# Patient Record
Sex: FEMALE | Race: WHITE | NOT HISPANIC OR LATINO | Employment: FULL TIME | URBAN - METROPOLITAN AREA
[De-identification: names, ages, dates, MRNs, and addresses within clinical notes are randomized per-mention and may not be internally consistent; named-entity substitution may affect disease eponyms.]

---

## 2017-10-18 ENCOUNTER — HOSPITAL ENCOUNTER (EMERGENCY)
Facility: HOSPITAL | Age: 42
Discharge: HOME | End: 2017-10-19
Attending: EMERGENCY MEDICINE
Payer: COMMERCIAL

## 2017-10-18 DIAGNOSIS — G89.29 CHRONIC EPIGASTRIC PAIN: Primary | ICD-10-CM

## 2017-10-18 DIAGNOSIS — R10.13 CHRONIC EPIGASTRIC PAIN: Primary | ICD-10-CM

## 2017-10-18 LAB
BASOPHILS # BLD AUTO: 0.04 THOUSANDS/ΜL (ref 0–0.1)
BASOPHILS NFR BLD AUTO: 0 % (ref 0–1)
EOSINOPHIL # BLD AUTO: 0.22 THOUSAND/ΜL (ref 0–0.61)
EOSINOPHIL NFR BLD AUTO: 2 % (ref 0–6)
ERYTHROCYTE [DISTWIDTH] IN BLOOD BY AUTOMATED COUNT: 13 % (ref 11.6–15.1)
HCT VFR BLD AUTO: 36.3 % (ref 34.8–46.1)
HGB BLD-MCNC: 12.1 G/DL (ref 11.5–15.4)
LYMPHOCYTES # BLD AUTO: 3.54 THOUSANDS/ΜL (ref 0.6–4.47)
LYMPHOCYTES NFR BLD AUTO: 32 % (ref 14–44)
MCH RBC QN AUTO: 30.5 PG (ref 26.8–34.3)
MCHC RBC AUTO-ENTMCNC: 33.3 G/DL (ref 31.4–37.4)
MCV RBC AUTO: 91 FL (ref 82–98)
MONOCYTES # BLD AUTO: 0.73 THOUSAND/ΜL (ref 0.17–1.22)
MONOCYTES NFR BLD AUTO: 7 % (ref 4–12)
NEUTROPHILS # BLD AUTO: 6.65 THOUSANDS/ΜL (ref 1.85–7.62)
NEUTS SEG NFR BLD AUTO: 59 % (ref 43–75)
PLATELET # BLD AUTO: 342 THOUSANDS/UL (ref 149–390)
PMV BLD AUTO: 9.4 FL (ref 8.9–12.7)
RBC # BLD AUTO: 3.97 MILLION/UL (ref 3.81–5.12)
WBC # BLD AUTO: 11.18 THOUSAND/UL (ref 4.31–10.16)

## 2017-10-18 PROCEDURE — 83690 ASSAY OF LIPASE: CPT | Performed by: EMERGENCY MEDICINE

## 2017-10-18 PROCEDURE — 96374 THER/PROPH/DIAG INJ IV PUSH: CPT

## 2017-10-18 PROCEDURE — 80053 COMPREHEN METABOLIC PANEL: CPT | Performed by: EMERGENCY MEDICINE

## 2017-10-18 PROCEDURE — 36415 COLL VENOUS BLD VENIPUNCTURE: CPT | Performed by: EMERGENCY MEDICINE

## 2017-10-18 PROCEDURE — 85025 COMPLETE CBC W/AUTO DIFF WBC: CPT | Performed by: EMERGENCY MEDICINE

## 2017-10-18 RX ORDER — KETOROLAC TROMETHAMINE 30 MG/ML
30 INJECTION, SOLUTION INTRAMUSCULAR; INTRAVENOUS ONCE
Status: COMPLETED | OUTPATIENT
Start: 2017-10-18 | End: 2017-10-18

## 2017-10-18 RX ADMIN — KETOROLAC TROMETHAMINE 30 MG: 30 INJECTION, SOLUTION INTRAMUSCULAR at 23:50

## 2017-10-19 ENCOUNTER — APPOINTMENT (EMERGENCY)
Dept: CT IMAGING | Facility: HOSPITAL | Age: 42
End: 2017-10-19
Payer: COMMERCIAL

## 2017-10-19 VITALS
OXYGEN SATURATION: 96 % | DIASTOLIC BLOOD PRESSURE: 75 MMHG | WEIGHT: 165 LBS | TEMPERATURE: 98.2 F | RESPIRATION RATE: 18 BRPM | HEART RATE: 85 BPM | SYSTOLIC BLOOD PRESSURE: 125 MMHG

## 2017-10-19 LAB
ALBUMIN SERPL BCP-MCNC: 3.8 G/DL (ref 3.5–5)
ALP SERPL-CCNC: 58 U/L (ref 46–116)
ALT SERPL W P-5'-P-CCNC: 29 U/L (ref 12–78)
ANION GAP SERPL CALCULATED.3IONS-SCNC: 9 MMOL/L (ref 4–13)
AST SERPL W P-5'-P-CCNC: 15 U/L (ref 5–45)
BILIRUB SERPL-MCNC: 0.2 MG/DL (ref 0.2–1)
BUN SERPL-MCNC: 18 MG/DL (ref 5–25)
CALCIUM SERPL-MCNC: 9.7 MG/DL (ref 8.3–10.1)
CHLORIDE SERPL-SCNC: 101 MMOL/L (ref 100–108)
CO2 SERPL-SCNC: 26 MMOL/L (ref 21–32)
CREAT SERPL-MCNC: 0.83 MG/DL (ref 0.6–1.3)
EXT PREG TEST URINE: NEGATIVE
GFR SERPL CREATININE-BSD FRML MDRD: 88 ML/MIN/1.73SQ M
GLUCOSE SERPL-MCNC: 108 MG/DL (ref 65–140)
LIPASE SERPL-CCNC: 477 U/L (ref 73–393)
POTASSIUM SERPL-SCNC: 3.9 MMOL/L (ref 3.5–5.3)
PROT SERPL-MCNC: 7.5 G/DL (ref 6.4–8.2)
SODIUM SERPL-SCNC: 136 MMOL/L (ref 136–145)

## 2017-10-19 PROCEDURE — 74177 CT ABD & PELVIS W/CONTRAST: CPT

## 2017-10-19 PROCEDURE — 99284 EMERGENCY DEPT VISIT MOD MDM: CPT

## 2017-10-19 PROCEDURE — 81025 URINE PREGNANCY TEST: CPT | Performed by: EMERGENCY MEDICINE

## 2017-10-19 RX ADMIN — IOHEXOL 100 ML: 350 INJECTION, SOLUTION INTRAVENOUS at 01:39

## 2017-10-19 NOTE — DISCHARGE INSTRUCTIONS
Abdominal Pain   WHAT YOU NEED TO KNOW:   Abdominal pain can be dull, achy, or sharp  You may have pain in one area of your abdomen, or in your entire abdomen  Your pain may be caused by a condition such as constipation, food sensitivity or poisoning, infection, or a blockage  Abdominal pain can also be from a hernia, appendicitis, or an ulcer  Liver, gallbladder, or kidney conditions can also cause abdominal pain  The cause of your abdominal pain may be unknown  DISCHARGE INSTRUCTIONS:   Return to the emergency department if:   · You have new chest pain or shortness of breath  · You have pulsing pain in your upper abdomen or lower back that suddenly becomes constant  · Your pain is in the right lower abdominal area and worsens with movement  · You have a fever over 100 4°F (38°C) or shaking chills  · You are vomiting and cannot keep food or liquids down  · Your pain does not improve or gets worse over the next 8 to 12 hours  · You see blood in your vomit or bowel movements, or they look black and tarry  · Your skin or the whites of your eyes turn yellow  · You are a woman and have a large amount of vaginal bleeding that is not your monthly period  Contact your healthcare provider if:   · You have pain in your lower back  · You are a man and have pain in your testicles  · You have pain when you urinate  · You have questions or concerns about your condition or care  Follow up with your healthcare provider within 24 hours or as directed:  Write down your questions so you remember to ask them during your visits  Medicines:   · Medicines  may be given to calm your stomach and prevent vomiting or to decrease pain  Ask how to take pain medicine safely  · Take your medicine as directed  Contact your healthcare provider if you think your medicine is not helping or if you have side effects  Tell him of her if you are allergic to any medicine   Keep a list of the medicines, vitamins, and herbs you take  Include the amounts, and when and why you take them  Bring the list or the pill bottles to follow-up visits  Carry your medicine list with you in case of an emergency  © 2017 [address] [employer] Information is for End User's use only and may not be sold, redistributed or otherwise used for commercial purposes  All illustrations and images included in CareNotes® are the copyrighted property of A D [employer]  or [employer]  The above information is an  only  It is not intended as medical advice for individual conditions or treatments  Talk to your doctor, nurse or pharmacist before following any medical regimen to see if it is safe and effective for you

## 2017-10-19 NOTE — ED PROVIDER NOTES
History  Chief Complaint   Patient presents with    Abdominal Pain     pt c/o upper abd pain under her ribs, pt sts "pains up into my jaw " per pt feels as if it was her galbladder but she's had that removed  denies n/v/d     Pt in ER with c/o intermittent epigastric pain x 6mths-1yr, of spontaneous onset and resolution, which radiates into her back and jaw  Pt saw her PCP at [facility] for this concern 2 days ago and had outpt labs done  Pt now in Er, concerned that she needs an ultrasound  She denies n/v/d/appetite changes            Prior to Admission Medications   Prescriptions Last Dose Informant Patient Reported? Taking? BUPROPION HCL PO 10/18/2017 at Unknown time  Yes Yes   Sig: Take 1 tablet by mouth daily   SERTRALINE HCL PO 10/18/2017 at Unknown time  Yes Yes   Sig: Take 1 tablet by mouth daily      Facility-Administered Medications: None       Past Medical History:   Diagnosis Date    Anxiety     Depression        Past Surgical History:   Procedure Laterality Date    CHOLECYSTECTOMY         History reviewed  No pertinent family history  I have reviewed and agree with the history as documented  Social History   Substance Use Topics    Smoking status: Never Smoker    Smokeless tobacco: Never Used    Alcohol use Yes      Comment: social        Review of Systems   Gastrointestinal: Positive for abdominal pain  Negative for diarrhea, nausea and vomiting  All other systems reviewed and are negative        Physical Exam  ED Triage Vitals   Temperature Pulse Respirations Blood Pressure SpO2   10/18/17 2353 10/18/17 2244 10/18/17 2244 10/18/17 2244 10/18/17 2244   98 2 °F (36 8 °C) 89 18 149/74 98 %      Temp Source Heart Rate Source Patient Position - Orthostatic VS BP Location FiO2 (%)   10/18/17 2353 10/18/17 2244 10/18/17 2244 10/18/17 2244 --   Oral Monitor Sitting Right arm       Pain Score       10/18/17 2244       4           Physical Exam   Constitutional: She appears well-developed and well-nourished  No distress  HENT:   Head: Normocephalic and atraumatic  Eyes: Conjunctivae are normal  Pupils are equal, round, and reactive to light  Neck: Normal range of motion  Neck supple  Cardiovascular: Normal rate, regular rhythm and normal heart sounds  No murmur heard  Pulmonary/Chest: Effort normal and breath sounds normal  No respiratory distress  Abdominal: Soft  Bowel sounds are normal  She exhibits no distension  There is tenderness in the epigastric area  There is rebound  There is no rigidity, no guarding, no CVA tenderness and negative Becker's sign  Musculoskeletal: Normal range of motion  She exhibits no edema or deformity  Neurological: She is alert  No cranial nerve deficit  Skin: Skin is warm and dry  No rash noted  She is not diaphoretic  No pallor  Psychiatric: She has a normal mood and affect  Her behavior is normal    Nursing note and vitals reviewed        ED Medications  Medications   ketorolac (TORADOL) 30 mg/mL injection 30 mg (30 mg Intravenous Given 10/18/17 6717)   iohexol (OMNIPAQUE) 350 MG/ML injection (SINGLE-DOSE) 100 mL (100 mL Intravenous Given 10/19/17 9269)       Diagnostic Studies  Labs Reviewed   CBC AND DIFFERENTIAL - Abnormal        Result Value Ref Range Status    WBC 11 18 (*) 4 31 - 10 16 Thousand/uL Final    RBC 3 97  3 81 - 5 12 Million/uL Final    Hemoglobin 12 1  11 5 - 15 4 g/dL Final    Hematocrit 36 3  34 8 - 46 1 % Final    MCV 91  82 - 98 fL Final    MCH 30 5  26 8 - 34 3 pg Final    MCHC 33 3  31 4 - 37 4 g/dL Final    RDW 13 0  11 6 - 15 1 % Final    MPV 9 4  8 9 - 12 7 fL Final    Platelets 543  339 - 390 Thousands/uL Final    Neutrophils Relative 59  43 - 75 % Final    Lymphocytes Relative 32  14 - 44 % Final    Monocytes Relative 7  4 - 12 % Final    Eosinophils Relative 2  0 - 6 % Final    Basophils Relative 0  0 - 1 % Final    Neutrophils Absolute 6 65  1 85 - 7 62 Thousands/µL Final    Lymphocytes Absolute 3 54  0 60 - 4 47 Thousands/µL Final    Monocytes Absolute 0 73  0 17 - 1 22 Thousand/µL Final    Eosinophils Absolute 0 22  0 00 - 0 61 Thousand/µL Final    Basophils Absolute 0 04  0 00 - 0 10 Thousands/µL Final   LIPASE - Abnormal     Lipase 477 (*) 73 - 393 u/L Final   POCT PREGNANCY, URINE - Normal    EXT PREG TEST UR (Ref: Negative) negative   Final   COMPREHENSIVE METABOLIC PANEL    Sodium 435  136 - 145 mmol/L Final    Potassium 3 9  3 5 - 5 3 mmol/L Final    Chloride 101  100 - 108 mmol/L Final    CO2 26  21 - 32 mmol/L Final    Anion Gap 9  4 - 13 mmol/L Final    BUN 18  5 - 25 mg/dL Final    Creatinine 0 83  0 60 - 1 30 mg/dL Final    Comment: Standardized to IDMS reference method    Glucose 108  65 - 140 mg/dL Final    Comment:   If the patient is fasting, the ADA then defines impaired fasting glucose as > 100 mg/dL and diabetes as > or equal to 123 mg/dL  Specimen collection should occur prior to Sulfasalazine administration due to the potential for falsely depressed results  Specimen collection should occur prior to Sulfapyridine administration due to the potential for falsely elevated results  Calcium 9 7  8 3 - 10 1 mg/dL Final    AST 15  5 - 45 U/L Final    Comment:   Specimen collection should occur prior to Sulfasalazine administration due to the potential for falsely depressed results  ALT 29  12 - 78 U/L Final    Comment:   Specimen collection should occur prior to Sulfasalazine administration due to the potential for falsely depressed results       Alkaline Phosphatase 58  46 - 116 U/L Final    Total Protein 7 5  6 4 - 8 2 g/dL Final    Albumin 3 8  3 5 - 5 0 g/dL Final    Total Bilirubin 0 20  0 20 - 1 00 mg/dL Final    eGFR 88  ml/min/1 73sq m Final    Narrative:     National Kidney Disease Education Program recommendations are as follows:  GFR calculation is accurate only with a steady state creatinine  Chronic Kidney disease less than 60 ml/min/1 73 sq  meters  Kidney failure less than 15 ml/min/1 73 sq  meters  CT abdomen pelvis with contrast   Final Result   3 mm pleural-based nodule left lower lobe  Based on current Fleischner Society 2017 Guidelines on incidental pulmonary nodule, no routine follow-up is needed if the patient is considered low risk for lung cancer  If the patient is considered    high risk for lung cancer, 12 month follow-up non-contrast chest CT is recommended  Enlarged mildly fatty liver  Subcentimeter low attenuation liver lesions which are too small to characterize, most likely small cysts  Subcentimeter low-attenuation lesion right kidney, also too small to characterize, likely a small cyst       Probably a collapsing hemorrhagic cyst or corpus luteum right ovary  Findings are consistent with the preliminary report from [facility] which was provided shortly after completion of the exam                       Workstation performed: [id number]             Procedures  Procedures      Phone Contacts  ED Phone Contact    ED Course  ED Course                                MDM  Number of Diagnoses or Management Options  Diagnosis management comments: FINDINGS:  Lower thorax: No acute findings identified  ABDOMEN:  Liver: Tiny low attenuation hepatic lesion noted, too small to characterize  Gallbladder and bile ducts: Status post cholecystectomy  No ductal dilation  Pancreas: Unremarkable  No mass  No ductal dilation  Spleen: Unremarkable  No splenomegaly  Adrenals: Unremarkable  No mass  Kidneys and ureters: Likely tiny right renal cyst  No hydronephrosis  Stomach and bowel: Unremarkable  No obstruction  No mucosal thickening  Appendix: Normal appendix  PELVIS:  Bladder: Decompressed bladder  Reproductive: Retroverted uterus  ABDOMEN and PELVIS:  Intraperitoneal space: Unremarkable  No free air  No significant fluid collection  Bones/joints: No acute fracture  No dislocation  Soft tissues: Unremarkable  Vasculature: Unremarkable   No abdominal aortic aneurysm  Lymph nodes: Unremarkable  No enlarged lymph nodes  IMPRESSION:  No acute abnormality identified within the abdomen or pelvis  Pt is pain free in the Er  Will d/c to home with PCP f/u  [facility] Time    Disposition  Final diagnoses:   Chronic epigastric pain     ED Disposition     ED Disposition Condition Comment    Discharge  Olimpia Martinez discharge to home/self care  Condition at discharge: Stable        Follow-up Information     Follow up With Specialties Details Why [id number] [facility],   Call in 1 day  [address]      Jovanny Tracy MD Gastroenterology Call in 1 day  [address] [facility] [address] [place] [address]  [phone]          Discharge Medication List as of 10/19/2017  2:03 AM      CONTINUE these medications which have NOT CHANGED    Details   BUPROPION HCL PO Take 1 tablet by mouth daily, Historical Med      SERTRALINE HCL PO Take 1 tablet by mouth daily, Historical Med           No discharge procedures on file      ED Provider  Electronically Signed by       Dannie Rueda DO  10/19/17 6490

## 2017-10-19 NOTE — ED NOTES
Pt returned from [facility], [facility]  10/19/17 7929

## 2018-10-22 ENCOUNTER — TELEPHONE (OUTPATIENT)
Dept: FAMILY MEDICINE CLINIC | Facility: CLINIC | Age: 43
End: 2018-10-22

## 2019-02-28 PROBLEM — L24.3 IRRITANT CONTACT DERMATITIS DUE TO COSMETICS: Status: ACTIVE | Noted: 2019-02-28

## 2019-02-28 PROBLEM — R21 RASH: Status: ACTIVE | Noted: 2019-02-28

## 2019-02-28 PROBLEM — J30.1 SEASONAL ALLERGIC RHINITIS DUE TO POLLEN: Status: ACTIVE | Noted: 2019-02-28

## 2019-02-28 PROBLEM — IMO0001 ALLERGIC REACTION, URTICARIA: Status: ACTIVE | Noted: 2019-02-28

## 2019-03-10 PROBLEM — H10.13 ALLERGIC CONJUNCTIVITIS OF BOTH EYES: Status: ACTIVE | Noted: 2019-03-10

## 2019-03-10 PROBLEM — J30.89 ALLERGIC RHINITIS CAUSED BY MOLD: Status: ACTIVE | Noted: 2019-03-10

## 2019-03-10 PROBLEM — J30.89 ALLERGIC RHINITIS DUE TO HOUSE DUST MITE: Status: ACTIVE | Noted: 2019-03-10

## 2019-03-10 PROBLEM — J30.81 ALLERGIC RHINITIS DUE TO CATS: Status: ACTIVE | Noted: 2019-03-10

## 2019-03-10 PROBLEM — L20.84 INTRINSIC ECZEMA: Status: ACTIVE | Noted: 2019-03-10

## 2024-02-21 PROBLEM — H10.13 ALLERGIC CONJUNCTIVITIS OF BOTH EYES: Status: RESOLVED | Noted: 2019-03-10 | Resolved: 2024-02-21

## 2024-07-29 ENCOUNTER — OFFICE VISIT (OUTPATIENT)
Dept: GASTROENTEROLOGY | Facility: CLINIC | Age: 49
End: 2024-07-29
Payer: COMMERCIAL

## 2024-07-29 VITALS
TEMPERATURE: 97.6 F | HEIGHT: 62 IN | WEIGHT: 171.4 LBS | SYSTOLIC BLOOD PRESSURE: 118 MMHG | BODY MASS INDEX: 31.54 KG/M2 | DIASTOLIC BLOOD PRESSURE: 78 MMHG

## 2024-07-29 DIAGNOSIS — Z80.0 FH: COLON CANCER IN FIRST DEGREE RELATIVE <60 YEARS OLD: ICD-10-CM

## 2024-07-29 DIAGNOSIS — K21.9 GASTROESOPHAGEAL REFLUX DISEASE, UNSPECIFIED WHETHER ESOPHAGITIS PRESENT: Primary | ICD-10-CM

## 2024-07-29 DIAGNOSIS — K76.0 FATTY LIVER: ICD-10-CM

## 2024-07-29 PROCEDURE — 99244 OFF/OP CNSLTJ NEW/EST MOD 40: CPT | Performed by: INTERNAL MEDICINE

## 2024-07-29 RX ORDER — OMEPRAZOLE 20 MG/1
20 CAPSULE, DELAYED RELEASE ORAL DAILY
Qty: 30 CAPSULE | Refills: 2 | Status: SHIPPED | OUTPATIENT
Start: 2024-07-29 | End: 2024-10-27

## 2024-07-29 NOTE — PATIENT INSTRUCTIONS
Scheduled date of colonoscopy/EGD (as of today): 09/17/2024  Physician performing colonoscopy: Dr. Starr   Location of colonoscopy: [facility]  Bowel prep reviewed with patient: Clenpiq or Mirlax  Instructions reviewed with patient by: Elisha LOPEZ   Clearances:  N/A

## 2024-07-29 NOTE — PROGRESS NOTES
[facility]  Outpatient Consultation  Encounter: [id number]    PATIENT INFO     Name: Rocio Koenig  YOB: 1975   Age: 49 y.o.   Sex: female   MRN: [id number]    ASSESSMENT & PLAN     1. Gastroesophageal reflux disease, unspecified whether esophagitis present: Patient has a history of longstanding reflux for >30 years. Patient complains of  uncontrolled reflux symptoms with associated bloating.  Symptoms occur about 3 times weekly and triggers include coffee and wine. Patient denies regurgitation or abdominal pain. There are no alarm symptoms (dysphagia, odynophagia, GI bleeding, weight loss). Denies nocturnal symptoms. Currently using Pepcid without relief. She was previously following with another GI provider and reportedly had an upper endoscopy >5 years ago which she believes was normal; however we do not have documentation of this.   Schedule for outpatient upper endoscopy to evaluate for Dee's esophagus and erosive esophagitis  Start omeprazole 20 mg daily; advised to take PPI 30 - 60 min prior to meals  Discuss the importance of lifestyle modifications including:  Recommend small meals and avoidance of ulcerogenic foods   Avoid eating prior to bedtime, elevate head of bed at night  Limit intake of alcohol and caffeine   Routine exercise to promote weight loss  Strongly advised smoking cessation  -     omeprazole (PriLOSEC) 20 mg delayed release capsule; Take 1 capsule (20 mg total) by mouth daily  -     EGD; Future; Expected date: 07/29/2024    2. FH: colon cancer in first degree relative <60 years old: Patient is due for colon cancer screening/surveillance. Patient currently without symptoms including presence of alarms symptoms such as weight loss or changes in stool habits. Patient with a family history of colon cancer (mother diagnosed in her 50s). She reports colonoscopy >5 years ago which was reportedly normal; so regardless she would be due for  surveillance colonoscopy at this time.    Schedule for outpatient colonoscopy to screen for colorectal cancer  Orders placed for bowel preparation   -     Colonoscopy; Future; Expected date: 07/29/2024  -     sodium picosulfate, magnesium oxide, citric acid (Clenpiq) oral solution; Take 175 mL (1 bottle) the evening before the colonoscopy, between 5 PM and 9 PM, followed by a second 175 mL bottle 5 hours before the colonoscopy.    3. Fatty liver: CT abdomen with contrast 10/19/17 which showed fatty liver. She does report occasional alcohol use but no significant alcohol use history. Lab review shows no history of notable transaminase elevation that would indicate steatoheptatitis and her liver synthetic function appears normal. Last CBC and CMP from May 2023 were normal. I suspect the etiology of her fatty liver is MAFLD.   Obtain updated CBC and CMP to calculate NAFLD fibrosis score and FIB-4 to determine her risk of fibrosis  Recommend weight loss; goal would be 0.5-1 kg/week over 6 months  Avoid alcohol use  Consider weight management referral at future visit if patient is unable to achieve goal weight loss  -     CBC and Platelet; Future  -     Comprehensive metabolic panel; Future  -     CBC and Platelet  -     Comprehensive metabolic panel     Orders Placed This Encounter   Procedures    CBC and Platelet    Comprehensive metabolic panel    Colonoscopy    EGD     FOLLOW-UP: Schedule EGD and colonoscopy; follow-up after endoscopy     HISTORY OF PRESENT ILLNESS       Rocio Koenig is a 49 y.o. female with a past medical history of GERD, prior cholecystectomy, fatty liver, obesity, anxiety, and depression who presents to GI office for consultation.     Patient reports acid reflux for more than 10 years. She also reports associated bloating. Denies abdominal cramping, dysphagia, odynophagia, diarrhea, constipation, blood in stool. Symptoms occur a few times a week. Reports predominant evening symptoms.  Triggers include wine and her symptoms build throughout the day. Patient takes OTC Pepcid without significant relief of symptoms. Denies nocturnal symptoms. Social smoker. Does not drink wine daily.     Mother had colon cancer diagnosed in 50s and had resection. Father had renal cell carcinoma and passed away 3 years. 1 sister in good health. She reports her last colonoscopy was more than 5 years ago with Dr. Misbah Alex.      ENDOSCOPIC HISTORY     UPPER ENDOSCOPY: Reports EGD more than 5 years ago that she believes was normal.   COLONOSCOPY: Reports colonoscopy at the same time that was normal.     REVIEW OF SYSTEMS     ROS negative other than stated above    Historical Information   Past Medical History:   Diagnosis Date    Allergic reaction, urticaria 2/28/2019    Allergic rhinitis     Anxiety     Atopic dermatitis     allergic on her lips     Depression     Food intolerance     GERD (gastroesophageal reflux disease)     Headache     Heartburn     Rash      Past Surgical History:   Procedure Laterality Date    CHOLECYSTECTOMY      COLONOSCOPY      DILATION AND CURETTAGE, DIAGNOSTIC / THERAPEUTIC      WISDOM TOOTH EXTRACTION      X4     Social History   Social History     Substance and Sexual Activity   Alcohol Use Yes    Comment: social     Social History     Substance and Sexual Activity   Drug Use No     Social History     Tobacco Use   Smoking Status Former    Current packs/day: 0.50    Average packs/day: 0.5 packs/day for 4.0 years (2.0 ttl pk-yrs)    Types: Cigarettes   Smokeless Tobacco Never     Family History   Problem Relation Age of Onset    Allergic rhinitis Mother     Colon cancer Mother     Kidney cancer Father     Bone cancer Father     Allergic rhinitis Sister     Asthma Sister     Eczema Son     No Known Problems Daughter          MEDICATIONS AND ALLERGIES     Current Outpatient Medications   Medication Instructions    buPROPion (WELLBUTRIN XL) 150 mg 24 hr tablet Daily    BUPROPION HCL PO 1  "tablet, Oral, Daily    Digestive Enzymes CAPS Oral, As needed    hydrocortisone 2.5 % ointment As needed    levocetirizine (XYZAL) 5 MG tablet 1 tablet, Oral, Daily    Multiple Vitamins-Minerals (MULTIVITAMIN ADULT PO) 1 capsule, Oral, Daily PRN    omeprazole (PRILOSEC) 20 mg, Oral, Daily    sertraline (ZOLOFT) 50 mg tablet Daily    SERTRALINE HCL PO 1 tablet, Oral, Daily    sodium picosulfate, magnesium oxide, citric acid (Clenpiq) oral solution Take 175 mL (1 bottle) the evening before the colonoscopy, between 5 PM and 9 PM, followed by a second 175 mL bottle 5 hours before the colonoscopy.     Allergies   Allergen Reactions    Gluten Meal - Food Allergy Other (See Comments)     Per patient she may have intolerance/allergy to Gluten, she is not sure    Sulfamethoxazole-Trimethoprim Other (See Comments)    Terbinafine Other (See Comments)    Sulfa Antibiotics Hives       PHYSICAL EXAM      Objective   Blood pressure 118/78, temperature 97.6 °F (36.4 °C), temperature source Tympanic, height 5' 2\" (1.575 m), weight 77.7 kg (171 lb 6.4 oz). Body mass index is 31.35 kg/m².    General Appearance:   Alert, cooperative, no distress   HEENT:   Normocephalic, atraumatic, anicteric     Neck:   Supple, symmetrical, trachea midline   Lungs:   Equal chest rise, respirations unlabored    Heart:   Regular rate and rhythm   Abdomen:   Soft, non-tender, non-distended; normal bowel sounds; no masses, no organomegaly   Rectal:   Deferred    Extremities:   No cyanosis, clubbing or edema    Neuro:   Moves all 4 extremities    Skin:   No jaundice, rashes, or lesions      LABORATORY RESULTS     No visits with results within 1 Day(s) from this visit.   Latest known visit with results is:   Admission on 10/18/2017, Discharged on 10/19/2017   Component Date Value    Sodium 10/18/2017 136     Potassium 10/18/2017 3.9     Chloride 10/18/2017 101     CO2 10/18/2017 26     ANION GAP 10/18/2017 9     BUN 10/18/2017 18     Creatinine 10/18/2017 " 0.83     Glucose 10/18/2017 108     Calcium 10/18/2017 9.7     AST 10/18/2017 15     ALT 10/18/2017 29     Alkaline Phosphatase 10/18/2017 58     Total Protein 10/18/2017 7.5     Albumin 10/18/2017 3.8     Total Bilirubin 10/18/2017 0.20     eGFR 10/18/2017 88     WBC 10/18/2017 11.18 (H)     RBC 10/18/2017 3.97     Hemoglobin 10/18/2017 12.1     Hematocrit 10/18/2017 36.3     MCV 10/18/2017 91     MCH 10/18/2017 30.5     MCHC 10/18/2017 33.3     RDW 10/18/2017 13.0     MPV 10/18/2017 9.4     Platelets 10/18/2017 342     Segmented % 10/18/2017 59     Lymphocytes % 10/18/2017 32     Monocytes % 10/18/2017 7     Eosinophils Relative 10/18/2017 2     Basophils Relative 10/18/2017 0     Absolute Neutrophils 10/18/2017 6.65     Absolute Lymphocytes 10/18/2017 3.54     Absolute Monocytes 10/18/2017 0.73     Eosinophils Absolute 10/18/2017 0.22     Basophils Absolute 10/18/2017 0.04     Lipase 10/18/2017 477 (H)     EXT PREG TEST UR (Ref: N* 10/19/2017 negative      No results found.    RADIOLOGY RESULTS: I have personally reviewed pertinent imaging studies.      Brian Mendez DO  Gastroenterology Fellow  [facility]  Division of Gastroenterology & Hepatology    ** Please Note: This note is constructed using a voice recognition dictation system. **

## 2024-08-26 ENCOUNTER — PATIENT MESSAGE (OUTPATIENT)
Dept: GASTROENTEROLOGY | Facility: CLINIC | Age: 49
End: 2024-08-26

## 2024-09-03 ENCOUNTER — ANESTHESIA EVENT (OUTPATIENT)
Dept: ANESTHESIOLOGY | Facility: HOSPITAL | Age: 49
End: 2024-09-03

## 2024-09-03 ENCOUNTER — ANESTHESIA (OUTPATIENT)
Dept: ANESTHESIOLOGY | Facility: HOSPITAL | Age: 49
End: 2024-09-03

## 2024-09-10 ENCOUNTER — TELEPHONE (OUTPATIENT)
Age: 49
End: 2024-09-10

## 2024-10-16 ENCOUNTER — OFFICE VISIT (OUTPATIENT)
Age: 49
End: 2024-10-16
Payer: COMMERCIAL

## 2024-10-16 VITALS
DIASTOLIC BLOOD PRESSURE: 76 MMHG | WEIGHT: 176 LBS | BODY MASS INDEX: 32.39 KG/M2 | SYSTOLIC BLOOD PRESSURE: 120 MMHG | HEIGHT: 62 IN

## 2024-10-16 DIAGNOSIS — R92.333 HETEROGENEOUSLY DENSE TISSUE OF BOTH BREASTS ON MAMMOGRAPHY: ICD-10-CM

## 2024-10-16 DIAGNOSIS — Z01.419 ENCNTR FOR GYN EXAM (GENERAL) (ROUTINE) W/O ABN FINDINGS: Primary | ICD-10-CM

## 2024-10-16 DIAGNOSIS — Z12.31 ENCOUNTER FOR SCREENING MAMMOGRAM FOR MALIGNANT NEOPLASM OF BREAST: ICD-10-CM

## 2024-10-16 DIAGNOSIS — Z11.51 SCREENING FOR HPV (HUMAN PAPILLOMAVIRUS): ICD-10-CM

## 2024-10-16 DIAGNOSIS — N84.1 CERVICAL POLYP: ICD-10-CM

## 2024-10-16 PROCEDURE — G0476 HPV COMBO ASSAY CA SCREEN: HCPCS | Performed by: OBSTETRICS & GYNECOLOGY

## 2024-10-16 PROCEDURE — 88305 TISSUE EXAM BY PATHOLOGIST: CPT | Performed by: PATHOLOGY

## 2024-10-16 PROCEDURE — 88344 IMHCHEM/IMCYTCHM EA MLT ANTB: CPT | Performed by: PATHOLOGY

## 2024-10-16 PROCEDURE — G0145 SCR C/V CYTO,THINLAYER,RESCR: HCPCS | Performed by: OBSTETRICS & GYNECOLOGY

## 2024-10-16 PROCEDURE — 57500 BIOPSY OF CERVIX: CPT | Performed by: OBSTETRICS & GYNECOLOGY

## 2024-10-16 PROCEDURE — S0612 ANNUAL GYNECOLOGICAL EXAMINA: HCPCS | Performed by: OBSTETRICS & GYNECOLOGY

## 2024-10-16 NOTE — PROGRESS NOTES
Rocio NAVEEN Arya  1975      CC:  Yearly exam    S:  49 y.o. female here for yearly exam. Her cycles are regular, not heavy or crampy.     Period Cycle (Days): 32  Period Duration (Days): 4-5  Period Pattern: Regular  Menstrual Flow: Heavy, Light (heavy first day and then light)  Menstrual Control: Tampon  Menstrual Control Change Freq (Hours): 2-3  Dysmenorrhea: (!) Moderate (only first day)  Dysmenorrhea Symptoms: Cramping, Other (Comment) (back pain, bloating and night sweats, breast pain)      She denies vaginal discharge, itching, pelvic pain.   She has no urinary concerns, does not have incontinence.  No bowel concerns.  No breast concerns.     Sexual activity: She is sexually active without pain, bleeding or dryness.   She is  and monogamous.   She is not interested in STD screening today.     Contraception: She uses vasectomy  for contraception.     Last Pap: Uncertain, no hx abnormals  Last Mammo:  5/22/24 - BiRad 1, dense --> discussed ABUS, ordered - aware to check on insurance.     We reviewed [facility] guidelines for Pap testing today.     Family hx of breast cancer: no  Family hx of ovarian cancer: no  Family hx of colon cancer: no      Current Outpatient Medications:     buPROPion (WELLBUTRIN XL) 150 mg 24 hr tablet, daily, Disp: , Rfl:     BUPROPION HCL PO, Take 1 tablet by mouth daily, Disp: , Rfl:     Digestive Enzymes CAPS, Take by mouth as needed, Disp: , Rfl:     levocetirizine (XYZAL) 5 MG tablet, Take 1 tablet by mouth daily, Disp: , Rfl:     Multiple Vitamins-Minerals (MULTIVITAMIN ADULT PO), Take 1 capsule by mouth daily as needed, Disp: , Rfl:     omeprazole (PriLOSEC) 20 mg delayed release capsule, Take 1 capsule (20 mg total) by mouth daily, Disp: 30 capsule, Rfl: 2    sertraline (ZOLOFT) 50 mg tablet, daily, Disp: , Rfl:     SERTRALINE HCL PO, Take 1 tablet by mouth daily, Disp: , Rfl:     hydrocortisone 2.5 % ointment, as needed, Disp: , Rfl: 1    sodium picosulfate, magnesium  "oxide, citric acid (Clenpiq) oral solution, Take 175 mL (1 bottle) the evening before the colonoscopy, between 5 PM and 9 PM, followed by a second 175 mL bottle 5 hours before the colonoscopy. (Patient not taking: Reported on 10/16/2024), Disp: 350 mL, Rfl: 0  Patient Active Problem List   Diagnosis    Rash    Allergic reaction, urticaria    Seasonal allergic rhinitis due to pollen    Irritant contact dermatitis due to cosmetics    Allergic rhinitis due to cats    Allergic rhinitis due to house dust mite    Allergic rhinitis caused by mold    Intrinsic eczema     Past Medical History:   Diagnosis Date    Allergic reaction, urticaria 2/28/2019    Allergic rhinitis     Anxiety     Atopic dermatitis     allergic on her lips     Depression     Food intolerance     GERD (gastroesophageal reflux disease)     Headache     Heartburn     Rash      Family History   Problem Relation Age of Onset    Allergic rhinitis Mother     Colon cancer Mother     Kidney cancer Father     Bone cancer Father     Allergic rhinitis Sister     Asthma Sister     Eczema Son     No Known Problems Daughter           Review of Systems   Respiratory: Negative.    Cardiovascular: Negative.    Gastrointestinal: Negative for constipation and diarrhea.     O:  Blood pressure 120/76, height 5' 2\" (1.575 m), weight 79.8 kg (176 lb), last menstrual period 09/30/2024.    Patient appears well and is not in distress  Breasts are symmetrical without mass, tenderness, nipple discharge, skin changes or adenopathy.   Abdomen is soft and nontender without masses.   External genitals are normal without lesions or rashes.  Urethral meatus and urethra are normal  Bladder is normal to palpation  Vagina is normal without discharge or bleeding.   Cervix is normal without discharge or lesion, cervical polyp present   Uterus is normal, mobile, nontender without palpable mass.  Adnexa are normal, nontender, without palpable mass.     A:  Yearly exam, Cervical Polyp.     P:  "  Pap & HPV today   Mammo ordered, ABUS ordered   Colon Cancer Screening up to date   RTO one year for yearly exam or sooner as needed.      Cervical Procedure    Date/Time: 10/16/2024 3:00 PM    Performed by: Elizabeth Weston MD  Authorized by: Elizabeth Weston MD    Verbal consent obtained?: Yes    Risks and benefits: Risks, benefits and alternatives were discussed    Consent given by:  Patient  Patient identity confirmed:  Verbally with patient  Procedure:     Procedure: Biopsy of cervix only      Cervical tissue removed with Allis/ Ring Forceps: yes      Specimen(s) to pathology: yes    Post-procedure:     Findings: Polyps      Patient tolerance of procedure:  Tolerated well, no immediate complications

## 2024-10-17 LAB
HPV HR 12 DNA CVX QL NAA+PROBE: NEGATIVE
HPV16 DNA CVX QL NAA+PROBE: NEGATIVE
HPV18 DNA CVX QL NAA+PROBE: NEGATIVE

## 2024-10-24 LAB
LAB AP GYN PRIMARY INTERPRETATION: NORMAL
Lab: NORMAL

## 2024-10-28 PROCEDURE — 88305 TISSUE EXAM BY PATHOLOGIST: CPT | Performed by: PATHOLOGY

## 2024-10-28 PROCEDURE — 88344 IMHCHEM/IMCYTCHM EA MLT ANTB: CPT | Performed by: PATHOLOGY

## 2024-10-29 ENCOUNTER — ANESTHESIA (OUTPATIENT)
Dept: ANESTHESIOLOGY | Facility: HOSPITAL | Age: 49
End: 2024-10-29

## 2024-10-29 ENCOUNTER — ANESTHESIA EVENT (OUTPATIENT)
Dept: ANESTHESIOLOGY | Facility: HOSPITAL | Age: 49
End: 2024-10-29

## 2024-11-04 ENCOUNTER — TELEPHONE (OUTPATIENT)
Age: 49
End: 2024-11-04

## 2024-12-05 ENCOUNTER — ANESTHESIA EVENT (OUTPATIENT)
Dept: ANESTHESIOLOGY | Facility: HOSPITAL | Age: 49
End: 2024-12-05

## 2024-12-05 ENCOUNTER — ANESTHESIA (OUTPATIENT)
Dept: ANESTHESIOLOGY | Facility: HOSPITAL | Age: 49
End: 2024-12-05

## 2024-12-12 DIAGNOSIS — K21.9 GASTROESOPHAGEAL REFLUX DISEASE, UNSPECIFIED WHETHER ESOPHAGITIS PRESENT: ICD-10-CM

## 2024-12-23 ENCOUNTER — TELEPHONE (OUTPATIENT)
Age: 49
End: 2024-12-23

## 2025-02-17 ENCOUNTER — ANESTHESIA EVENT (OUTPATIENT)
Dept: ANESTHESIOLOGY | Facility: HOSPITAL | Age: 50
End: 2025-02-17

## 2025-02-17 ENCOUNTER — ANESTHESIA (OUTPATIENT)
Dept: ANESTHESIOLOGY | Facility: HOSPITAL | Age: 50
End: 2025-02-17

## 2025-05-12 ENCOUNTER — TELEPHONE (OUTPATIENT)
Dept: GASTROENTEROLOGY | Facility: CLINIC | Age: 50
End: 2025-05-12

## 2025-05-12 NOTE — TELEPHONE ENCOUNTER
I called & lvm for the patient in regards to rescheduling upcoming appointment with Imani Pinzon in August due to Maternity Leave. I asked the patient to call the office back and reschedule with alternative provider for f/u appt.

## 2025-05-14 ENCOUNTER — TELEMEDICINE (OUTPATIENT)
Age: 50
End: 2025-05-14
Payer: COMMERCIAL

## 2025-05-14 DIAGNOSIS — N95.1 PERIMENOPAUSAL SYMPTOMS: Primary | ICD-10-CM

## 2025-05-14 PROBLEM — L20.84 INTRINSIC ECZEMA: Status: RESOLVED | Noted: 2019-03-10 | Resolved: 2025-05-14

## 2025-05-14 PROBLEM — R21 RASH: Status: RESOLVED | Noted: 2019-02-28 | Resolved: 2025-05-14

## 2025-05-14 PROBLEM — J30.81 ALLERGIC RHINITIS DUE TO CATS: Status: RESOLVED | Noted: 2019-03-10 | Resolved: 2025-05-14

## 2025-05-14 PROBLEM — J30.89 ALLERGIC RHINITIS CAUSED BY MOLD: Status: RESOLVED | Noted: 2019-03-10 | Resolved: 2025-05-14

## 2025-05-14 PROBLEM — J30.89 ALLERGIC RHINITIS DUE TO HOUSE DUST MITE: Status: RESOLVED | Noted: 2019-03-10 | Resolved: 2025-05-14

## 2025-05-14 PROBLEM — L24.3 IRRITANT CONTACT DERMATITIS DUE TO COSMETICS: Status: RESOLVED | Noted: 2019-02-28 | Resolved: 2025-05-14

## 2025-05-14 PROBLEM — IMO0001 ALLERGIC REACTION, URTICARIA: Status: RESOLVED | Noted: 2019-02-28 | Resolved: 2025-05-14

## 2025-05-14 PROCEDURE — 99215 OFFICE O/P EST HI 40 MIN: CPT | Performed by: OBSTETRICS & GYNECOLOGY

## 2025-05-14 RX ORDER — PROGESTERONE 100 MG/1
100 CAPSULE ORAL
Qty: 30 CAPSULE | Refills: 11 | Status: SHIPPED | OUTPATIENT
Start: 2025-05-14

## 2025-05-16 NOTE — PROGRESS NOTES
Virtual Regular VisitName: Rocio Koenig      : 1975      MRN: [id number]  Encounter Provider: Heather Perez MD  Encounter Date: 2025   [facility] [place]  :  Assessment & Plan  Perimenopausal symptoms    Orders:    Progesterone 100 MG CAPS; Take 100 mg by mouth at bedtime      1)  Discussed the wide and varied hormonal fluctuations associated with the perimenopausal time frame, often resulting in estrogen dominance and relative progesterone deficiency. This results in menorrhagia, uterine cellular growth with fibroids, endometriosis, breast density along with progesterone loss symptoms including sleep and anxiety or mood.   2) Options for treatment for perimenopausal symptoms reviewed: a) OCP for complete ovarian suppression and menstrual regulation; b) Progesterone, either cyclic or continuous, excellent for mood, sleep; c) estradiol metabolizing botanical supplements such as Vitex, DIM, parra anel.  3) The OCP would be the most ideal choice to affect period quality, but contraindicated with smoking. She has cut down greatly. Offered Chantix, Nicoderm patch, declined for now. Will start with progesterone 100 mg nightly, Breast Blend supplement through [employer]. May contemplate IUD with this regimen if needed for bleeding control, discuss with gyn.   4) Email with progress report in a couple months. Follow up prn.     This was a 50 minute visit with greater than 50% of time spent in face to face counseling and coordination of care      Rocio presents for hormone consult, her first visit with me; self referred, sees Dr. Weston for gyn care with [facility]  Rocio is perimenopausal, abstinence for contraception. Complains of:  1) Periods are still virtually monthly, but longer, heavier, crampy and with nausea.   2) Night sweats  3) axillary lymph node swelling and breast tenderness and swelling week before period, much better after period is over.    PMXH;s/p live, HD/C  SHX: 25 pack year smoker, denies ETOH  FHX: Mom colon ca. Dad COD Renal Ca 70. 1 sister, anxiety. 2 kids, 20/16, healthy.     Review of Systems   Constitutional: Negative.    Gastrointestinal:  Positive for nausea.   Endocrine: Positive for heat intolerance.   Genitourinary:  Positive for menstrual problem and pelvic pain.   Musculoskeletal:         Lymphadenopathy, breast pain   Neurological: Negative.    Psychiatric/Behavioral:  Positive for dysphoric mood and sleep disturbance. The patient is nervous/anxious.          Physical Exam    Administrative Statements   Encounter provider Heather Perez MD    The Patient is located at Home and in the following state in which I hold an active license [place].    The patient was identified by name and date of birth. Rocio NAVEEN Koenig was informed that this is a telemedicine visit and that the visit is being conducted through the Epic Embedded platform. She agrees to proceed..  My office door was closed. No one else was in the room.  She acknowledged consent and understanding of privacy and security of the video platform. The patient has agreed to participate and understands they can discontinue the visit at any time.    I have spent a total time of 50 minutes in caring for this patient on the day of the visit/encounter including Impressions, Counseling / Coordination of care, Documenting in the medical record, Reviewing/placing orders in the medical record (including tests, medications, and/or procedures), Obtaining or reviewing history  , and Communicating with other healthcare professionals , not including the time spent for establishing the audio/video connection.

## 2025-05-23 ENCOUNTER — HOSPITAL ENCOUNTER (OUTPATIENT)
Dept: MAMMOGRAPHY | Facility: IMAGING CENTER | Age: 50
Discharge: HOME | End: 2025-05-23
Payer: COMMERCIAL

## 2025-05-23 VITALS — HEIGHT: 62 IN | BODY MASS INDEX: 30 KG/M2 | WEIGHT: 163 LBS

## 2025-05-23 DIAGNOSIS — Z01.419 ENCNTR FOR GYN EXAM (GENERAL) (ROUTINE) W/O ABN FINDINGS: ICD-10-CM

## 2025-05-23 DIAGNOSIS — Z12.31 ENCOUNTER FOR SCREENING MAMMOGRAM FOR MALIGNANT NEOPLASM OF BREAST: ICD-10-CM

## 2025-05-23 PROCEDURE — 77063 BREAST TOMOSYNTHESIS BI: CPT

## 2025-05-23 PROCEDURE — 77067 SCR MAMMO BI INCL CAD: CPT

## 2025-06-16 ENCOUNTER — ANESTHESIA (OUTPATIENT)
Dept: ANESTHESIOLOGY | Facility: HOSPITAL | Age: 50
End: 2025-06-16

## 2025-06-16 ENCOUNTER — ANESTHESIA EVENT (OUTPATIENT)
Dept: ANESTHESIOLOGY | Facility: HOSPITAL | Age: 50
End: 2025-06-16

## 2025-06-17 ENCOUNTER — PREP FOR PROCEDURE (OUTPATIENT)
Age: 50
End: 2025-06-17

## 2025-06-17 ENCOUNTER — TELEPHONE (OUTPATIENT)
Dept: GASTROENTEROLOGY | Facility: CLINIC | Age: 50
End: 2025-06-17

## 2025-06-17 DIAGNOSIS — K76.0 FATTY LIVER: ICD-10-CM

## 2025-06-17 DIAGNOSIS — K21.9 GASTROESOPHAGEAL REFLUX DISEASE, UNSPECIFIED WHETHER ESOPHAGITIS PRESENT: Primary | ICD-10-CM

## 2025-06-17 DIAGNOSIS — Z80.0 FH: COLON CANCER IN FIRST DEGREE RELATIVE <60 YEARS OLD: Primary | ICD-10-CM

## 2025-06-17 NOTE — TELEPHONE ENCOUNTER
Patients GI provider:       Number to return call: [phone]    Reason for call: Pt called to reschedule colonoscopy/EGD. Pt is requesting Miralax/Dulcolax prep instead of Clenpiq.     Scheduled procedure/appointment date if applicable: 9/15/2025      rescheduled date of EGD/colonoscopy (as of today):9/15/2025  Physician performing EGD/colonoscopy:Dr Starr  Location of EGD/colonoscopy:[facility]  Desired bowel prep reviewed with patient:pt is requesting Miralax/Dulcolax  Instructions reviewed with patient by:office to send instructions  Clearances:  N/A

## 2025-06-18 ENCOUNTER — TELEPHONE (OUTPATIENT)
Age: 50
End: 2025-06-18

## 2025-06-18 NOTE — TELEPHONE ENCOUNTER
I called the patient & lvm for them that I sent updated instructions for Miralax/ Dulcolax prep. I asked the patient to call back with any other questions or concerns.